# Patient Record
Sex: MALE | Race: WHITE | Employment: STUDENT | URBAN - NONMETROPOLITAN AREA
[De-identification: names, ages, dates, MRNs, and addresses within clinical notes are randomized per-mention and may not be internally consistent; named-entity substitution may affect disease eponyms.]

---
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
## 2025-01-20 ENCOUNTER — TELEPHONE (OUTPATIENT)
Dept: FAMILY MEDICINE CLINIC | Facility: CLINIC | Age: 20
End: 2025-01-20

## 2025-01-20 NOTE — TELEPHONE ENCOUNTER
Spoke with pt about updating his primary pcp with his insurance. Pt stated that he called them and changed his pcp after his second appointment with [facility], but insures that he will call his insurance again tomorrow to make sure they changed it for their records.

## 2025-05-16 ENCOUNTER — VBI (OUTPATIENT)
Dept: ADMINISTRATIVE | Facility: OTHER | Age: 20
End: 2025-05-16

## 2025-05-16 NOTE — TELEPHONE ENCOUNTER
05/16/25 7:47 AM     Chart reviewed for [facility] Visits was/were not submitted to the patient's insurance.     Fadia Warner MA   [facility]

## 2025-06-09 ENCOUNTER — TELEPHONE (OUTPATIENT)
Dept: FAMILY MEDICINE CLINIC | Facility: CLINIC | Age: 20
End: 2025-06-09

## 2025-06-09 NOTE — TELEPHONE ENCOUNTER
Spoke with pt regarding no show visit today. Pt apologized as he forgot about visit and promptly rescheduled appointment.

## 2025-07-01 ENCOUNTER — TELEPHONE (OUTPATIENT)
Dept: FAMILY MEDICINE CLINIC | Facility: CLINIC | Age: 20
End: 2025-07-01

## 2025-07-01 NOTE — TELEPHONE ENCOUNTER
Patient was no show at our practice today. Left voicemail explaining no show/late policy. Please call office back if you would like to reschedule. Thank you.

## 2025-07-17 ENCOUNTER — TELEPHONE (OUTPATIENT)
Dept: FAMILY MEDICINE CLINIC | Facility: CLINIC | Age: 20
End: 2025-07-17

## 2025-07-17 NOTE — TELEPHONE ENCOUNTER
Patient was a no show visit in our office today. Called patient and left vm to please call and reschedule missed appointment. Thank you.